# Patient Record
Sex: FEMALE | Race: BLACK OR AFRICAN AMERICAN | NOT HISPANIC OR LATINO | Employment: OTHER | URBAN - METROPOLITAN AREA
[De-identification: names, ages, dates, MRNs, and addresses within clinical notes are randomized per-mention and may not be internally consistent; named-entity substitution may affect disease eponyms.]

---

## 2023-12-19 ENCOUNTER — PATIENT OUTREACH (OUTPATIENT)
Dept: ADMINISTRATIVE | Facility: CLINIC | Age: 68
End: 2023-12-19
Payer: MEDICARE

## 2025-03-20 ENCOUNTER — PATIENT OUTREACH (OUTPATIENT)
Dept: ADMINISTRATIVE | Facility: CLINIC | Age: 70
End: 2025-03-20
Payer: MEDICARE

## 2025-03-20 ENCOUNTER — PATIENT MESSAGE (OUTPATIENT)
Dept: ADMINISTRATIVE | Facility: CLINIC | Age: 70
End: 2025-03-20
Payer: MEDICARE

## 2025-03-20 NOTE — PROGRESS NOTES
C3 nurse attempted to contact Sujata Arellano for a TCC post hospital discharge follow up call. No answer. Voicemail left.  The patient does not have a scheduled HOSFU appointment, and the pt does not have an [facility] PCP.

## 2025-03-21 NOTE — PROGRESS NOTES
C3 nurse spoke with Sujata Arellano for a TCC post hospital discharge follow up call. The patient stated she has a scheduled HOSFU appointment with Orville Elizabeth MD on 4/8/25 @ 9:00.  The patient stated this was the earliest appt available and they will contact her if an earlier appt opens up.  Patient also stated the PCP listed on her chart is now retired.  Patient stated she is also taking spironolactone 25mg once daily, Trelegy inhaler once daily, pantoprazole 40mg once daily,  tylenol #3 every 6 hours for 10 days and an antibiotic (patient not sure of name).  Medications could not be reconciled as they are not on the patient's medication list.

## 2025-03-21 NOTE — PROGRESS NOTES
2nd attempt-C3 nurse attempted to contact Sujata Arellano for a TCC post hospital discharge follow up call. The patient answered but was unable to conduct the call at this time and requested a call back in about 30 minutes.  The patient does not have a scheduled HOSFU appointment, and the pt does not have an [facility] PCP.